# Patient Record
Sex: FEMALE | Employment: FULL TIME | URBAN - METROPOLITAN AREA
[De-identification: names, ages, dates, MRNs, and addresses within clinical notes are randomized per-mention and may not be internally consistent; named-entity substitution may affect disease eponyms.]

---

## 2023-04-19 ENCOUNTER — OFFICE VISIT (OUTPATIENT)
Dept: PRIMARY CARE | Facility: CLINIC | Age: 43
End: 2023-04-19
Payer: COMMERCIAL

## 2023-04-19 VITALS
TEMPERATURE: 98.7 F | BODY MASS INDEX: 28.32 KG/M2 | DIASTOLIC BLOOD PRESSURE: 75 MMHG | HEIGHT: 66 IN | WEIGHT: 176.2 LBS | SYSTOLIC BLOOD PRESSURE: 124 MMHG | HEART RATE: 71 BPM | OXYGEN SATURATION: 99 %

## 2023-04-19 DIAGNOSIS — F41.9 ANXIETY: ICD-10-CM

## 2023-04-19 DIAGNOSIS — R76.8 ANA POSITIVE: ICD-10-CM

## 2023-04-19 DIAGNOSIS — Z00.00 ROUTINE ADULT HEALTH MAINTENANCE: Primary | ICD-10-CM

## 2023-04-19 DIAGNOSIS — R79.89 HIGH SERUM THYROID STIMULATING HORMONE (TSH): ICD-10-CM

## 2023-04-19 DIAGNOSIS — R73.9 ELEVATED BLOOD SUGAR: ICD-10-CM

## 2023-04-19 PROBLEM — E04.1 THYROID NODULE: Status: ACTIVE | Noted: 2023-04-19

## 2023-04-19 LAB — POC HEMOGLOBIN A1C: 5 % (ref 4.2–6.5)

## 2023-04-19 PROCEDURE — 1036F TOBACCO NON-USER: CPT | Performed by: NURSE PRACTITIONER

## 2023-04-19 PROCEDURE — 3008F BODY MASS INDEX DOCD: CPT | Performed by: NURSE PRACTITIONER

## 2023-04-19 PROCEDURE — 99386 PREV VISIT NEW AGE 40-64: CPT | Performed by: NURSE PRACTITIONER

## 2023-04-19 PROCEDURE — 83036 HEMOGLOBIN GLYCOSYLATED A1C: CPT | Performed by: NURSE PRACTITIONER

## 2023-04-19 RX ORDER — UBIDECARENONE 30 MG
30 CAPSULE ORAL DAILY
COMMUNITY

## 2023-04-19 RX ORDER — ASCORBIC ACID 1000 MG
175 TABLET ORAL DAILY
COMMUNITY
End: 2024-04-25 | Stop reason: ALTCHOICE

## 2023-04-19 RX ORDER — SAME BUTANEDISULFONATE/BETAINE 400-600 MG
POWDER IN PACKET (EA) ORAL
COMMUNITY

## 2023-04-19 RX ORDER — MULTIVITAMIN/IRON/FOLIC ACID 18MG-0.4MG
1 TABLET ORAL
COMMUNITY
End: 2024-04-25 | Stop reason: ALTCHOICE

## 2023-04-19 RX ORDER — GLUCOSAM/CHONDRO/HERB 149/HYAL 750-100 MG
TABLET ORAL
COMMUNITY

## 2023-04-19 RX ORDER — DULOXETIN HYDROCHLORIDE 20 MG/1
20 CAPSULE, DELAYED RELEASE ORAL DAILY
Qty: 90 CAPSULE | Refills: 0 | Status: SHIPPED | OUTPATIENT
Start: 2023-04-19 | End: 2024-04-25 | Stop reason: ALTCHOICE

## 2023-04-19 RX ORDER — DULOXETIN HYDROCHLORIDE 20 MG/1
1 CAPSULE, DELAYED RELEASE ORAL DAILY
COMMUNITY
Start: 2023-01-16 | End: 2023-04-19 | Stop reason: SDUPTHER

## 2023-04-19 ASSESSMENT — ENCOUNTER SYMPTOMS
WOUND: 0
EYE PAIN: 0
VOMITING: 0
HEMATURIA: 0
NECK PAIN: 0
ADENOPATHY: 0
POLYPHAGIA: 0
COUGH: 0
DIZZINESS: 0
POLYDIPSIA: 0
CONFUSION: 0
CHILLS: 0
EYE REDNESS: 0
HEADACHES: 0
HALLUCINATIONS: 0
ABDOMINAL PAIN: 0
SHORTNESS OF BREATH: 0
BRUISES/BLEEDS EASILY: 0
SORE THROAT: 0
UNEXPECTED WEIGHT CHANGE: 0
EYE DISCHARGE: 0
TROUBLE SWALLOWING: 0
FATIGUE: 0
PALPITATIONS: 0
BLOOD IN STOOL: 0
APPETITE CHANGE: 0
BACK PAIN: 0
FREQUENCY: 0
DYSURIA: 0
FEVER: 0

## 2023-04-19 ASSESSMENT — PATIENT HEALTH QUESTIONNAIRE - PHQ9
1. LITTLE INTEREST OR PLEASURE IN DOING THINGS: NOT AT ALL
2. FEELING DOWN, DEPRESSED OR HOPELESS: NOT AT ALL
SUM OF ALL RESPONSES TO PHQ9 QUESTIONS 1 AND 2: 0

## 2023-04-19 NOTE — PATIENT INSTRUCTIONS
See rheum     A1C today=  This is the 3 month average of your sugars.  You are in the normal range which is 5.6 or less.      Recheck thyroid testing per dr govea    I will get records from your specialists.    Goal <100 for fasting sugar (100).  Exercise-cardio 4-5d/wk 30min each day  Diet-Breakfast-toast (my favorite Norma Branham Delightful multi-grain and Vipul's Killer Bread thin-sliced Good Seed)/bagel/English muffin-whole wheat flour as a 1st ingredient or cereal/oatmeal/granola bar-fiber 4g or more; protein like eggs or peanut butter is Ok  Lunch-protein, veg 1c  Dinner-protein, veg 1c; if having potatoes, rice, noodles, or pasta-->fist sized; could try brown rice or whole wheat pasta or quinoa  Fruit 2 a day  Dairy 2 a day-milk, almond milk, soy milk, yogurt, cottage cheese, yogurt  Snacks-Protein-hard boiled egg, nuts (walnuts/almonds/pecans/pistachios 1/4c), hummus, beef/deer jerky; vegetable, fruit, dairy-milk(1%, skim, almond, soy)/cheese (not a lot of cheddar)/yogurt (Greek is best-my favorite Dannon Fruit on the Bottom Greek)/cottage cheese 2%; triscuits, popcorn have a lot of fiber; serving size  Water  Limit alcohol to 2 drinks per day (1 drink=12oz beer or 5oz wine or 1 1/2oz liquor)  appt in  6  months; be fasting      I will communicate with you via [employer] regarding messages and results. If you need help with this, you can call the support line at [phone].    IT WAS A PLEASURE TO SEE YOU TODAY. THANK YOU FOR CHOOSING US FOR YOUR HEALTHCARE NEEDS.

## 2023-04-19 NOTE — PROGRESS NOTES
Subjective   Patient ID: Stella Thornton is a 43 y.o. female who presents for Establish Care (Establish care/Fasting- No/Last labs-03/24/Tdap 9/3/2020/Flu shot- 2021/Last colonoscopy-2020/Last pap-10/2022/Last mammo 07/2022/).    new pt-krys-last pcp, gi, ob gyn, others?  Put names of providers in care team-not last pcp  last labs 3/24/23 gluc 100, ldl 110, hdl 44, trigs 143, mg nl, tsh high, t4 nl, t3 nl, vit d nl, cbc nl, loly high speckled 1:640, crp nl; labs per dr govea  is pt fasting? no  Tdap 9/2020  flu shot unavailable  last colonoscopy 2020-due 2025  last pap 10/2022  last mammo 7/8/2022    Family history form      No care team member to display    HPI  Last labs-3/24/23 gluc 100, ldl 110, hdl 44, trigs 143, mg nl, tsh high, t4 high, t3 nl, vit d nl, cbc nl, loly high speckled 1:640, crp nl  Due for labs- tsh, dr govea will recheck    No results found for: CHOL  No results found for: TRIG  No results found for: HDL  No results found for: LDL  No results found for: TSH  No components found for: A1C  No components found for: POCA1C  No results found for: ALBUR  No components found for: POCALBUR      Other concerns: none    bps at home- none    ER/urgicare visits in the last year- none  Hospitalizations in the last year- none      last Pap- 10/2022  H/o abn pap-none    FH ovarian, endometrial, cervical, uterine ca-none    Current birth control method-none  No change in contraception desired      last mammo- (40-75/90) 7/8/2022  Self breast exams-yes    FH br ca-none    last colonoscopy/cologuard/FIT (45-75) 2020  FH colon ca-none      Exercise- wts, exercise bike-fast then slow and walking  Diet-breakfast- eggs  once a wk or quinoa; lemon water; 1 c coffee  Lunch-salad or grab, water  Dinner-protein, starch, veg, water  Tries to stay away from sugar and wheat and dairy  Alcohol once every 2 mon  Snacks-tortilla chips and quac, cheese and gluten free crackers, apple and pb  Body mass index is 28.44 kg/m².          last dental appt- 2 wks    BMs-regular  Sleep-able to fall asleep and stay asleep; no snoring or apnea  no cp, swelling, sob, abd pain, n/v/d/c, blood in stool or black stools  STI testing including hiv (age 15-65) and hep c screening (18-79)-declines        Immunization History   Administered Date(s) Administered    Moderna SARS-CoV-2 Vaccination 01/16/2021, 02/13/2021, 12/10/2021    Tdap 09/03/2020       fractures in lifetime-none      FH heart attack, heart surgery-none  FH stroke-none    The ASCVD Risk score (Mario DK, et al., 2019) failed to calculate for the following reasons:    Cannot find a previous HDL lab    Cannot find a previous total cholesterol lab    Unable to determine if patient is Non-         Review of Systems   Constitutional:  Negative for appetite change, chills, fatigue, fever and unexpected weight change.   HENT:  Negative for congestion, ear pain, sore throat and trouble swallowing.    Eyes:  Negative for pain, discharge and redness.   Respiratory:  Negative for cough and shortness of breath.    Cardiovascular:  Negative for chest pain and palpitations.   Gastrointestinal:  Negative for abdominal pain, blood in stool and vomiting.   Endocrine: Negative for polydipsia, polyphagia and polyuria.   Genitourinary:  Negative for dysuria, frequency, hematuria and urgency.   Musculoskeletal:  Negative for back pain and neck pain.   Skin:  Negative for rash and wound.   Allergic/Immunologic: Negative for immunocompromised state.   Neurological:  Negative for dizziness, syncope and headaches.   Hematological:  Negative for adenopathy. Does not bruise/bleed easily.   Psychiatric/Behavioral:  Negative for confusion and hallucinations.        Objective   Visit Vitals  /75   Pulse 71   Temp 37.1 °C (98.7 °F) (Temporal)      BP Readings from Last 3 Encounters:   04/19/23 124/75     Wt Readings from Last 3 Encounters:   04/19/23 79.9 kg (176 lb 3.2 oz)            Physical Exam  Constitutional:       General: She is not in acute distress.     Appearance: Normal appearance. She is not ill-appearing.   HENT:      Head: Normocephalic.      Right Ear: Tympanic membrane, ear canal and external ear normal.      Left Ear: Tympanic membrane, ear canal and external ear normal.      Nose: Nose normal.      Mouth/Throat:      Mouth: Mucous membranes are moist.      Pharynx: Oropharynx is clear.   Eyes:      Extraocular Movements: Extraocular movements intact.      Conjunctiva/sclera: Conjunctivae normal.      Pupils: Pupils are equal, round, and reactive to light.   Cardiovascular:      Rate and Rhythm: Normal rate and regular rhythm.      Heart sounds: Normal heart sounds. No murmur heard.  Pulmonary:      Effort: Pulmonary effort is normal. No respiratory distress.      Breath sounds: Normal breath sounds. No wheezing, rhonchi or rales.   Abdominal:      General: Bowel sounds are normal.      Palpations: Abdomen is soft. There is no mass.      Tenderness: There is no abdominal tenderness.   Musculoskeletal:         General: No swelling or tenderness. Normal range of motion.      Cervical back: Normal range of motion and neck supple.      Right lower leg: No edema.      Left lower leg: No edema.   Skin:     General: Skin is warm.      Findings: No rash.   Neurological:      General: No focal deficit present.      Mental Status: She is alert and oriented to person, place, and time.      Cranial Nerves: No cranial nerve deficit.      Motor: No weakness.   Psychiatric:         Mood and Affect: Mood normal.         Behavior: Behavior normal.         Assessment/Plan   Diagnoses and all orders for this visit:  Routine adult health maintenance  Anxiety  -     DULoxetine (Cymbalta) 20 mg DR capsule; Take 1 capsule (20 mg) by mouth once daily.  BMI 28.0-28.9,adult  Elevated blood sugar  -     POCT glycosylated hemoglobin (Hb A1C) manually resulted  GUILLERMO positive  -     Referral to  Rheumatology; Future  High serum thyroid stimulating hormone (TSH)

## 2024-02-29 ENCOUNTER — OFFICE VISIT (OUTPATIENT)
Dept: PRIMARY CARE | Facility: CLINIC | Age: 44
End: 2024-02-29
Payer: COMMERCIAL

## 2024-02-29 VITALS
HEIGHT: 66 IN | HEART RATE: 84 BPM | DIASTOLIC BLOOD PRESSURE: 83 MMHG | OXYGEN SATURATION: 95 % | WEIGHT: 195.8 LBS | SYSTOLIC BLOOD PRESSURE: 132 MMHG | BODY MASS INDEX: 31.47 KG/M2 | TEMPERATURE: 97.1 F

## 2024-02-29 DIAGNOSIS — J40 BRONCHITIS: ICD-10-CM

## 2024-02-29 DIAGNOSIS — J01.00 ACUTE NON-RECURRENT MAXILLARY SINUSITIS: Primary | ICD-10-CM

## 2024-02-29 PROCEDURE — 99213 OFFICE O/P EST LOW 20 MIN: CPT | Performed by: NURSE PRACTITIONER

## 2024-02-29 PROCEDURE — 3008F BODY MASS INDEX DOCD: CPT | Performed by: NURSE PRACTITIONER

## 2024-02-29 PROCEDURE — 1036F TOBACCO NON-USER: CPT | Performed by: NURSE PRACTITIONER

## 2024-02-29 RX ORDER — DOXYCYCLINE 100 MG/1
100 CAPSULE ORAL 2 TIMES DAILY
Qty: 20 CAPSULE | Refills: 0 | Status: SHIPPED | OUTPATIENT
Start: 2024-02-29 | End: 2024-03-10

## 2024-02-29 RX ORDER — PREDNISONE 20 MG/1
TABLET ORAL
Qty: 15 TABLET | Refills: 0 | Status: SHIPPED | OUTPATIENT
Start: 2024-02-29 | End: 2024-04-24 | Stop reason: ALTCHOICE

## 2024-02-29 ASSESSMENT — ENCOUNTER SYMPTOMS
CHILLS: 0
DIARRHEA: 0
ARTHRALGIAS: 1
PALPITATIONS: 0
EYE REDNESS: 0
MYALGIAS: 1
ABDOMINAL PAIN: 0
SHORTNESS OF BREATH: 0
FEVER: 0
VOMITING: 0
COUGH: 1
EYE DISCHARGE: 0
CHEST TIGHTNESS: 1
WHEEZING: 1
FATIGUE: 1
HEADACHES: 1
SORE THROAT: 0
SINUS PRESSURE: 0
RHINORRHEA: 0
FACIAL SWELLING: 0

## 2024-02-29 ASSESSMENT — PATIENT HEALTH QUESTIONNAIRE - PHQ9
2. FEELING DOWN, DEPRESSED OR HOPELESS: NOT AT ALL
1. LITTLE INTEREST OR PLEASURE IN DOING THINGS: NOT AT ALL
SUM OF ALL RESPONSES TO PHQ9 QUESTIONS 1 AND 2: 0

## 2024-02-29 NOTE — PATIENT INSTRUCTIONS
Doxycycline  Prednisone  No advil, motrin, ibuprofen, aleve, naproxen or other anti-inflammatories while on prednisone.  Tylenol (acetaminophen) is OK to take.   Dayquil, nyquil  Fluids  Rest  Call if sx worsen or change or not starting to get better in 2-3d    Return in April for wellness      I will communicate with you via [employer] regarding messages and results. If you need help with this, you can call the support line at [phone].    IT WAS A PLEASURE TO SEE YOU TODAY. THANK YOU FOR CHOOSING US FOR YOUR HEALTHCARE NEEDS.

## 2024-02-29 NOTE — PROGRESS NOTES
Subjective   Patient ID: Stella Thornton is a 43 y.o. female who presents for Cough.  Last physical: 4/19/23  last labs -4/2023 A1c 5.0  3/24/23 gluc 100, ldl 110, hdl 44, trigs 143, mg nl, tsh high, t4 nl, t3 nl, vit d nl, cbc nl, loly high speckled 1:640, crp nl; labs per dr govea   Last mammo- 6/7/23    Did pt see rheumatologist? Yes   current sx- cough, body aches, fatigue, post nasal drip, feels like she's wheezing,   when did sx start- 1 week   did pt take a covid-19 test? Yes   if yes when? Sunday     Any other questions or concerns that pt wants to discuss today?   No     HPI  Cough with phlegm, tight upper chest,  body aches, fatigue, post nasal drip, stuffy nose, feels like she's wheezing, HA, glands swollen, ear pressure x 1 week   4d ago covid test neg    no sob,  fever, chills,  ST, new loss of taste or smell, diarrhea, vomiting, nausea, abdominal pain,  weakness, red eye, rash, bruising, cyanosis, ear pain, runny nose, post nasal drip, pain with deep breath, leg or foot swelling, calf pain  loss of appetite- yes  fluids-yes  has urinated at least 3 times in 24hrs  Seasonal allergies-none  Selftxt-tea, nyquil    No known exposure to COVID-19  No known exposure to strep  No known exposure to influenza  No one sick around the pt      Risk factors:  Chronic disease/comorbidities: none  not a healthcare worker  Age: not 65yrs of age and older      No care team member to display     Review of Systems   Constitutional:  Positive for fatigue. Negative for chills and fever.   HENT:  Positive for congestion and postnasal drip. Negative for ear discharge, ear pain, facial swelling, rhinorrhea, sinus pressure and sore throat.    Eyes:  Negative for discharge and redness.   Respiratory:  Positive for cough, chest tightness and wheezing. Negative for shortness of breath.    Cardiovascular:  Negative for chest pain, palpitations and leg swelling.   Gastrointestinal:  Negative for abdominal pain, diarrhea and vomiting.    Musculoskeletal:  Positive for arthralgias and myalgias.   Skin:  Negative for rash.   Neurological:  Positive for headaches.       Objective   Visit Vitals  /83   Pulse 84   Temp 36.2 °C (97.1 °F)      BP Readings from Last 3 Encounters:   02/29/24 132/83   04/19/23 124/75     Wt Readings from Last 3 Encounters:   02/29/24 88.8 kg (195 lb 12.8 oz)   04/19/23 79.9 kg (176 lb 3.2 oz)       Physical Exam  Constitutional:       Appearance: Normal appearance.   HENT:      Head: Normocephalic.      Right Ear: Tympanic membrane, ear canal and external ear normal.      Left Ear: Tympanic membrane, ear canal and external ear normal.      Nose: Nose normal.      Mouth/Throat:      Mouth: Mucous membranes are moist.      Pharynx: No oropharyngeal exudate or posterior oropharyngeal erythema.   Cardiovascular:      Rate and Rhythm: Normal rate and regular rhythm.      Heart sounds: Normal heart sounds.   Pulmonary:      Effort: Pulmonary effort is normal.      Breath sounds: Rhonchi present. No wheezing or rales.   Lymphadenopathy:      Cervical: No cervical adenopathy.   Neurological:      Mental Status: She is alert.       Assessment/Plan   Diagnoses and all orders for this visit:  Acute non-recurrent maxillary sinusitis  -     doxycycline (Vibramycin) 100 mg capsule; Take 1 capsule (100 mg) by mouth 2 times a day for 10 days. Take with at least 8 ounces (large glass) of water, do not lie down for 30 minutes after  Bronchitis  -     doxycycline (Vibramycin) 100 mg capsule; Take 1 capsule (100 mg) by mouth 2 times a day for 10 days. Take with at least 8 ounces (large glass) of water, do not lie down for 30 minutes after  -     predniSONE (Deltasone) 20 mg tablet; Take 2 tabs daily x 5d then 1 tab daily x 3d then 1/2 tab daily x 3d  Other orders  -     Follow Up In Primary Care - Health Maintenance; Future

## 2024-04-24 ASSESSMENT — ENCOUNTER SYMPTOMS
DYSURIA: 0
HEMATURIA: 0
TROUBLE SWALLOWING: 0
COUGH: 0
SORE THROAT: 0
FREQUENCY: 0
SHORTNESS OF BREATH: 0
DIZZINESS: 0
ABDOMINAL PAIN: 0
EYE PAIN: 0
VOMITING: 0
BACK PAIN: 0
EYE DISCHARGE: 0
POLYDIPSIA: 0
CHILLS: 0
PALPITATIONS: 0
BRUISES/BLEEDS EASILY: 0
FATIGUE: 0
CONFUSION: 0
APPETITE CHANGE: 0
HEADACHES: 0
POLYPHAGIA: 0
UNEXPECTED WEIGHT CHANGE: 0
EYE REDNESS: 0
WOUND: 0
HALLUCINATIONS: 0
FEVER: 0
ADENOPATHY: 0
BLOOD IN STOOL: 0
NECK PAIN: 0

## 2024-04-24 NOTE — PROGRESS NOTES
"Subjective   Patient ID: Stella Thornton is a 44 y.o. female who presents for Annual Exam.    Does pt see any other provides than:  homer Osei issa, gingo    is pt fasting? No   Who did last pap? Had hyster    Who did last colonoscopy? Dr. Uriostegui  6/19/20; due 6/2025    Does pt have any questions today? Feels like random heart racing, weight gain     Poc A1c=5.0      Seeing dr govea for thyroid and is a functional med dr Manley care team member to display    HPI  Last labs-4/2023 A1c 5.0  3/24/23 gluc 100, ldl 110, hdl 44, trigs 143, mg nl, tsh high, t4 high, t3 nl, vit d nl, cbc nl, loly high speckled 1:640, crp nl  Due for labs- all, not sure if going to dr govea so pt wants me to check thyroid labs    No results found for: \"CHOL\"  No results found for: \"TRIG\"  No results found for: \"HDL\"  No results found for: \"LDL\"  No results found for: \"TSH\"  No results found for: \"A1C\"  No components found for: \"POCA1C\"  No results found for: \"ALBUR\"  No components found for: \"POCALBUR\"      Other concerns: Feels like random heart racing when sitting or reading x 2mon  No cp, sob, wheezing, dizziness, HA, vision change    Hip pain when laying on side-for yrs    weight gain     bps at home- none    ER/urgicare visits in the last year- none  Hospitalizations in the last year- none      last Pap- hyster  H/o abn pap-none    FH ovarian, endometrial, cervical, uterine ca-none    Current birth control method-none, hyster  No change in contraception desired      last mammo- (40-75/90) 6/7/23; has appt scheduled  Self breast exams-yes    FH br ca-none    last colonoscopy/cologuard/FIT (45-75) 3/25/25; due 3/2030 dr uriostegui  FH colon ca-none      Exercise- wts, exercise bike-fast then slow and walking  Diet-breakfast- eggs  once a wk or quinoa; lemon water; 1 c coffee no sugar  Lunch-salad or grab, water  Dinner-protein, starch, veg, water  Tries to stay away from sugar and wheat and dairy  Alcohol once every 2 mon  Snacks-tortilla chips and " quac, cheese and gluten free crackers, apple and pb  Body mass index is 30.96 kg/m².         last dental appt- yesterday; pt is a dental hygienist    BMs-regular  Sleep-able to fall asleep and stay asleep-urinate once-hips hurt laying on sides; no snoring or apnea; no daytime tiredness  no cp, swelling, sob, abd pain, n/v/d/c, blood in stool or black stools  STI testing including hiv (age 15-65) and hep c screening (18-79)-declines        Immunization History   Administered Date(s) Administered    Moderna SARS-CoV-2 Vaccination 01/16/2021, 02/13/2021, 12/10/2021    Tdap vaccine, age 7 year and older (BOOSTRIX, ADACEL) 09/03/2020       fractures in lifetime-none  Fh osteoporosis-none    FH heart attack, heart surgery-none  FH stroke-none    The ASCVD Risk score (Mario DK, et al., 2019) failed to calculate for the following reasons:    Cannot find a previous HDL lab    Cannot find a previous total cholesterol lab    Unable to determine if patient is Non-         Review of Systems   Constitutional:  Negative for appetite change, chills, fatigue, fever and unexpected weight change.   HENT:  Negative for congestion, ear pain, sore throat and trouble swallowing.    Eyes:  Negative for pain, discharge and redness.   Respiratory:  Negative for cough and shortness of breath.    Cardiovascular:  Negative for chest pain and palpitations.        Tachycardia   Gastrointestinal:  Negative for abdominal pain, blood in stool and vomiting.   Endocrine: Negative for polydipsia, polyphagia and polyuria.   Genitourinary:  Negative for dysuria, frequency, hematuria and urgency.   Musculoskeletal:  Negative for back pain and neck pain.   Skin:  Negative for rash and wound.   Allergic/Immunologic: Negative for immunocompromised state.   Neurological:  Negative for dizziness, syncope and headaches.   Hematological:  Negative for adenopathy. Does not bruise/bleed easily.   Psychiatric/Behavioral:  Negative for  confusion and hallucinations.        Objective   Visit Vitals  /83   Pulse 69   Temp 36.2 °C (97.1 °F)        BP Readings from Last 3 Encounters:   04/25/24 137/83   02/29/24 132/83   04/19/23 124/75     Wt Readings from Last 3 Encounters:   04/25/24 87 kg (191 lb 12.8 oz)   02/29/24 88.8 kg (195 lb 12.8 oz)   04/19/23 79.9 kg (176 lb 3.2 oz)           Physical Exam  Constitutional:       General: She is not in acute distress.     Appearance: Normal appearance. She is not ill-appearing.   HENT:      Head: Normocephalic.      Right Ear: Tympanic membrane, ear canal and external ear normal.      Left Ear: Tympanic membrane, ear canal and external ear normal.      Nose: Nose normal.      Mouth/Throat:      Mouth: Mucous membranes are moist.      Pharynx: Oropharynx is clear.   Eyes:      Extraocular Movements: Extraocular movements intact.      Conjunctiva/sclera: Conjunctivae normal.      Pupils: Pupils are equal, round, and reactive to light.   Cardiovascular:      Rate and Rhythm: Normal rate and regular rhythm.      Heart sounds: Normal heart sounds. No murmur heard.  Pulmonary:      Effort: Pulmonary effort is normal. No respiratory distress.      Breath sounds: Normal breath sounds. No wheezing, rhonchi or rales.   Abdominal:      General: Bowel sounds are normal.      Palpations: Abdomen is soft. There is no mass.      Tenderness: There is no abdominal tenderness.   Musculoskeletal:         General: No swelling or tenderness. Normal range of motion.      Cervical back: Normal range of motion and neck supple.      Right lower leg: No edema.      Left lower leg: No edema.   Skin:     General: Skin is warm.      Findings: No rash.   Neurological:      General: No focal deficit present.      Mental Status: She is alert and oriented to person, place, and time.      Cranial Nerves: No cranial nerve deficit.      Motor: No weakness.   Psychiatric:         Mood and Affect: Mood normal.         Behavior: Behavior  normal.         Assessment/Plan   Diagnoses and all orders for this visit:  Routine general medical examination at a health care facility  -     Comprehensive Metabolic Panel; Future  -     CBC and Auto Differential; Future  -     Lipid Panel; Future  -     TSH; Future  Tachycardia  -     ECG 12 Lead  -     Referral to Cardiology; Future  Elevated blood sugar  -     POCT glycosylated hemoglobin (Hb A1C) manually resulted  Thyroid nodule  -     T4, free; Future  -     T3, free; Future  Fatigue, unspecified type  -     Vitamin D 25-Hydroxy,Total (for eval of Vitamin D levels); Future  GUILLERMO positive  -     GUILLERMO; Future  Other orders  -     Follow Up In Primary Care - Health Maintenance  -     Follow Up In Primary Care - Health Maintenance; Future

## 2024-04-25 ENCOUNTER — OFFICE VISIT (OUTPATIENT)
Dept: PRIMARY CARE | Facility: CLINIC | Age: 44
End: 2024-04-25
Payer: COMMERCIAL

## 2024-04-25 VITALS
DIASTOLIC BLOOD PRESSURE: 83 MMHG | TEMPERATURE: 97.1 F | WEIGHT: 191.8 LBS | HEART RATE: 69 BPM | BODY MASS INDEX: 30.82 KG/M2 | OXYGEN SATURATION: 98 % | SYSTOLIC BLOOD PRESSURE: 137 MMHG | HEIGHT: 66 IN

## 2024-04-25 DIAGNOSIS — Z00.00 ROUTINE GENERAL MEDICAL EXAMINATION AT A HEALTH CARE FACILITY: Primary | ICD-10-CM

## 2024-04-25 DIAGNOSIS — R76.8 ANA POSITIVE: ICD-10-CM

## 2024-04-25 DIAGNOSIS — R00.0 TACHYCARDIA: ICD-10-CM

## 2024-04-25 DIAGNOSIS — R73.9 ELEVATED BLOOD SUGAR: ICD-10-CM

## 2024-04-25 DIAGNOSIS — E04.1 THYROID NODULE: ICD-10-CM

## 2024-04-25 DIAGNOSIS — R53.83 FATIGUE, UNSPECIFIED TYPE: ICD-10-CM

## 2024-04-25 LAB — POC HEMOGLOBIN A1C: 5 % (ref 4.2–6.5)

## 2024-04-25 PROCEDURE — 1036F TOBACCO NON-USER: CPT | Performed by: NURSE PRACTITIONER

## 2024-04-25 PROCEDURE — 93000 ELECTROCARDIOGRAM COMPLETE: CPT | Performed by: NURSE PRACTITIONER

## 2024-04-25 PROCEDURE — 83036 HEMOGLOBIN GLYCOSYLATED A1C: CPT | Performed by: NURSE PRACTITIONER

## 2024-04-25 PROCEDURE — 99396 PREV VISIT EST AGE 40-64: CPT | Performed by: NURSE PRACTITIONER

## 2024-04-25 ASSESSMENT — PATIENT HEALTH QUESTIONNAIRE - PHQ9
2. FEELING DOWN, DEPRESSED OR HOPELESS: NOT AT ALL
SUM OF ALL RESPONSES TO PHQ9 QUESTIONS 1 AND 2: 0
1. LITTLE INTEREST OR PLEASURE IN DOING THINGS: NOT AT ALL

## 2024-04-25 NOTE — PATIENT INSTRUCTIONS
See cardio     EKG today    A1C today=5.0  This is the 3 month average of your sugars.  You are in the normal range which is 5.6 or less.    Keep mammo appt    We will get colon result from dr uriostegui    Handouts given to pt:  physical handout      I recommend signing up for MyChart.    Labs:    You can use the lab in our building when fasting. The hrs are: Monday-Friday, 7 a.m. - 5 p.m., Saturday 8 a.m. - 12 noon.   No appt needed, BUT YOU DO NEED THE PAPER ORDER.    Fasting is no food, drink, gum or mints other than water for 12 hrs.   Results will be back in 2-3 business days for most labs. It is always recommended for any orders (labs, xrays, ultrasounds,MRI, ct scan, procedures etc) to check with your insurance provider for expected costs or expenses to you.       You will get your results via phone from my medical assistant if you do not have MyChart.  OR  You will get your results via [employer]    If a result is urgent, I will call to speak to you.    Vaccines:  Up to date    General recommendations:  Exercise-cardio 4-5d/wk 30min each day  Diet-Breakfast-toast (my favorite Norma Branham Delighful Multigrain or Vipul's Killer Bread Good Seed thin-sliced)/bagel/English muffin-whole wheat flour as a 1st ingredient or cereal/oatmeal/granola bar-fiber 4g or more or protein like eggs or peanut butter; optional veggies  Lunch-protein, 1/2c carb or 2 slices bread, veg 1c  Dinner-protein, fist sized carb, veg 1c  Fruit 2 a day  Dairy 2 a day-milk, soy milk, almond milk, cheese, yogurt, cottage cheese  Snacks-Protein-hard boiled egg, nuts (walnuts/almonds/pecans/pistachios 1/4c), hummus, beef/deer jerky or meat sticks; vegetable, fruit, dairy-milk(1%, skim, almond, soy)/cheese (not a lot of cheddar)/yogurt (Greek is best-my favorite Dannon Fruit on the Bottom Greek)/cottage cheese 2%; triscuits/ popcorn/wheat thins have a lot of fiber; follow serving size on bag/box/container  increase water  Limit alcohol to 1 drink per day  for women and 2 drinks per day for men (1 drink=12oz beer or 5oz wine or 1 1/2oz liquor)  Calcium: 500mg 1 twice a day if age 50 and younger and 600mg 1 twice a day if over age 50 (calcium citrate can be taken without food)  Vitamin D: 800-5000 IU/day  Limit salt to <2300mg a day if age 50 and under and <1500mg a day if over age 50/have high bp or diabetes or kidney disease  Recommend folate for childbearing age women 0.4mg per day (can be found in a multivitamin)  Recommend 18mg/dL of iron a day if age 50 and under and 8mg/dL a day if over age 50; take on an empty stomach at bedtime  Use sunscreen   Wear seatbelt  Recommend safe sex practices: using condoms everytime you have sex, discuss with a new partner about their past partners/history of STDs/drug use, avoid drinking alcohol or using drugs as this increases the chance that you will participate in high-risk sex, for oral sex help protect your mouth by having your partner use a condom (male or female), women should not douche after sex, be aware of your partner's body and your body-look for signs of a sore, blister, rash, or discharge, and have regular exams and periodic tests for STDs.  No distracted driving  No driving when under influence of substances  Wear a seatbelt  Eye dr every 1-2yrs  Dentist every 6-12 mon  Tetanus shot every 10yrs  Recommend flu vaccine in the fall  Appt in  1 year for physical      I will communicate with you via [employer] regarding messages and results. If you need help with this, you can call the support line at [phone].    IT WAS A PLEASURE TO SEE YOU TODAY. THANK YOU FOR CHOOSING US FOR YOUR HEALTHCARE NEEDS.

## 2024-05-07 LAB
NON-UH HIE A/G RATIO: 1.3
NON-UH HIE ALB: 4.1 G/DL (ref 3.4–5)
NON-UH HIE ALK PHOS: 75 UNIT/L (ref 45–117)
NON-UH HIE BASO COUNT: 0.04 X1000 (ref 0–0.2)
NON-UH HIE BASOS %: 0.7 %
NON-UH HIE BILIRUBIN, TOTAL: 1.8 MG/DL (ref 0.3–1.2)
NON-UH HIE BUN/CREAT RATIO: 16.7
NON-UH HIE BUN: 15 MG/DL (ref 9–23)
NON-UH HIE CALCIUM: 9.3 MG/DL (ref 8.7–10.4)
NON-UH HIE CALCULATED LDL CHOLESTEROL: 121 MG/DL (ref 60–130)
NON-UH HIE CALCULATED OSMOLALITY: 282 MOSM/KG (ref 275–295)
NON-UH HIE CHLORIDE: 108 MMOL/L (ref 98–107)
NON-UH HIE CHOLESTEROL: 180 MG/DL (ref 100–200)
NON-UH HIE CO2, VENOUS: 26 MMOL/L (ref 20–31)
NON-UH HIE CREATININE: 0.9 MG/DL (ref 0.5–0.8)
NON-UH HIE DIFF?: NO
NON-UH HIE EOS COUNT: 0.1 X1000 (ref 0–0.5)
NON-UH HIE EOSIN %: 1.8 %
NON-UH HIE FREE T3: 3.1 PG/ML (ref 2.3–4.2)
NON-UH HIE FREE T4: 1.14 NG/DL (ref 0.89–1.76)
NON-UH HIE GFR AA: >60
NON-UH HIE GLOBULIN: 3.1 G/DL
NON-UH HIE GLOMERULAR FILTRATION RATE: >60 ML/MIN/1.73M?
NON-UH HIE GLUCOSE: 92 MG/DL (ref 74–106)
NON-UH HIE GOT: 52 UNIT/L (ref 15–37)
NON-UH HIE GPT: 31 UNIT/L (ref 10–49)
NON-UH HIE HCT: 42.1 % (ref 36–46)
NON-UH HIE HDL CHOLESTEROL: 39 MG/DL (ref 40–60)
NON-UH HIE HGB: 14 G/DL (ref 12–16)
NON-UH HIE INSTR WBC: 5.4
NON-UH HIE K: 4.6 MMOL/L (ref 3.5–5.1)
NON-UH HIE LYMPH %: 26.4 %
NON-UH HIE LYMPH COUNT: 1.41 X1000 (ref 1.2–4.8)
NON-UH HIE MCH: 30.2 PG (ref 27–34)
NON-UH HIE MCHC: 33.2 G/DL (ref 32–37)
NON-UH HIE MCV: 91 FL (ref 80–100)
NON-UH HIE MONO %: 8.1 %
NON-UH HIE MONO COUNT: 0.43 X1000 (ref 0.1–1)
NON-UH HIE MPV: 9.5 FL (ref 7.4–10.4)
NON-UH HIE NA: 141 MMOL/L (ref 135–145)
NON-UH HIE NEUTROPHIL %: 63.1 %
NON-UH HIE NEUTROPHIL COUNT (ANC): 3.38 X1000 (ref 1.4–8.8)
NON-UH HIE NUCLEATED RBC: 0 /100WBC
NON-UH HIE PLATELET: 217 X10 (ref 150–450)
NON-UH HIE RBC: 4.62 X10 (ref 4.2–5.4)
NON-UH HIE RDW: 12.3 % (ref 11.5–14.5)
NON-UH HIE TOTAL CHOL/HDL CHOL RATIO: 4.6
NON-UH HIE TOTAL PROTEIN: 7.2 G/DL (ref 5.7–8.2)
NON-UH HIE TRIGLYCERIDES: 99 MG/DL (ref 30–150)
NON-UH HIE TSH: 2.9 UIU/ML (ref 0.55–4.78)
NON-UH HIE VIT D 25: 81 NG/ML
NON-UH HIE WBC: 5.4 X10 (ref 4.5–11)

## 2024-05-08 ENCOUNTER — PATIENT MESSAGE (OUTPATIENT)
Dept: PRIMARY CARE | Facility: CLINIC | Age: 44
End: 2024-05-08
Payer: COMMERCIAL

## 2024-05-08 DIAGNOSIS — R10.2 PELVIC PAIN: Primary | ICD-10-CM

## 2024-05-08 LAB
NON-UH HIE ANA PATTERN: NORMAL
NON-UH HIE ANTI-NUCLEAR AB QUANT: NORMAL
NON-UH HIE ANTI-NUCLEAR ANTIBODY: POSITIVE

## 2024-05-09 ENCOUNTER — OFFICE VISIT (OUTPATIENT)
Dept: PRIMARY CARE | Facility: CLINIC | Age: 44
End: 2024-05-09
Payer: COMMERCIAL

## 2024-05-09 VITALS
WEIGHT: 190.2 LBS | SYSTOLIC BLOOD PRESSURE: 128 MMHG | HEART RATE: 69 BPM | HEIGHT: 66 IN | DIASTOLIC BLOOD PRESSURE: 70 MMHG | TEMPERATURE: 96.9 F | OXYGEN SATURATION: 98 % | BODY MASS INDEX: 30.57 KG/M2

## 2024-05-09 DIAGNOSIS — R74.8 ELEVATED LIVER ENZYMES: ICD-10-CM

## 2024-05-09 DIAGNOSIS — R35.0 URINARY FREQUENCY: Primary | ICD-10-CM

## 2024-05-09 LAB
POC APPEARANCE, URINE: CLEAR
POC BILIRUBIN, URINE: NEGATIVE
POC BLOOD, URINE: NEGATIVE
POC COLOR, URINE: YELLOW
POC GLUCOSE, URINE: NEGATIVE MG/DL
POC KETONES, URINE: ABNORMAL MG/DL
POC LEUKOCYTES, URINE: NEGATIVE
POC NITRITE,URINE: POSITIVE
POC PH, URINE: 5.5 PH
POC PROTEIN, URINE: NEGATIVE MG/DL
POC SPECIFIC GRAVITY, URINE: 1.02
POC UROBILINOGEN, URINE: 0.2 EU/DL

## 2024-05-09 PROCEDURE — 1036F TOBACCO NON-USER: CPT | Performed by: NURSE PRACTITIONER

## 2024-05-09 PROCEDURE — 81003 URINALYSIS AUTO W/O SCOPE: CPT | Performed by: NURSE PRACTITIONER

## 2024-05-09 PROCEDURE — 87086 URINE CULTURE/COLONY COUNT: CPT

## 2024-05-09 PROCEDURE — 99213 OFFICE O/P EST LOW 20 MIN: CPT | Performed by: NURSE PRACTITIONER

## 2024-05-09 RX ORDER — SULFAMETHOXAZOLE AND TRIMETHOPRIM 800; 160 MG/1; MG/1
1 TABLET ORAL 2 TIMES DAILY
Qty: 14 TABLET | Refills: 0 | Status: SHIPPED | OUTPATIENT
Start: 2024-05-09 | End: 2024-05-16

## 2024-05-09 ASSESSMENT — PATIENT HEALTH QUESTIONNAIRE - PHQ9
1. LITTLE INTEREST OR PLEASURE IN DOING THINGS: NOT AT ALL
SUM OF ALL RESPONSES TO PHQ9 QUESTIONS 1 AND 2: 0
2. FEELING DOWN, DEPRESSED OR HOPELESS: NOT AT ALL

## 2024-05-09 ASSESSMENT — ENCOUNTER SYMPTOMS
CONSTITUTIONAL NEGATIVE: 1
SHORTNESS OF BREATH: 0
WHEEZING: 0

## 2024-05-09 NOTE — PATIENT INSTRUCTIONS
Liver lab in 2wks  No fasting or appt needed    Follow up with rheumatologist re: positive GUILLERMO    urine culture result back in 2-3d  push fluids; can try cranberry juice  avoid drinks that irritate the bladder like coffee, alcohol, and soft drinks containing citrus juices or caffeine until your infection has cleared.   rest  tylenol  ibuprofen  heating pad on abdomen  start antibiotic; you can stop the antibiotic if the culture comes back negative/normal.  call if sx worsen or change especially fever, chills, or back pain or not starting to get better in 2-3d        I will communicate with you via [employer] regarding messages and results. If you need help with this, you can call the support line at [phone].    IT WAS A PLEASURE TO SEE YOU TODAY. THANK YOU FOR CHOOSING US FOR YOUR HEALTHCARE NEEDS.

## 2024-05-09 NOTE — PROGRESS NOTES
Subjective   Patient ID: Stella Thornton is a 44 y.o. female who presents for UTI.  Last physical 4/25/24   Last labs 5/2024 One liver enzyme is high. Decrease alcohol and tylenol (acetaminophen) if applicable.  Recheck lab in 2wks. You do not need to be fasting.  Sugar (aka glucose), kidney function,  and electrolytes in the CMP (comprehensive metabolic panel) were normal  Cholesterol:  Trigs normal  Hdl low at 39. Goal >50 for women. Incr exercise  Ldl high at 121. Goal <100. Decr fats and incr fiber.   TSH , T4, T3 (thyroid tests) were normal.  Vit D is normal.  CBC (complete blood count) was normal which looks at infection and anemia markers.  A1c 5.0    When did sx start? Monday   What sx is she having? Frequency, pressure, urgency     Took uti otc med to help       HPI  Print lab order    No heart issues since last appt so holding off on cardio dr appt    uti symptoms for 3d  Frequency, pressure, urgency   no dysuria, hematuria, lower back pain, fever, chills, cloudy urine, odor to urine, small amount of urine when urinate, nausea, vomiting, incontinence  no itching, skin feels like it is burning, redness, rash, thick (thin or watery) vaginal d/c, irritation of genital area, swelling of genital area, painful IC  no fishy vaginal odor especially after IC or thin whitish-gray vaginal d/c    selftxt: azo 1 dose        No care team member to display     Review of Systems   Constitutional: Negative.    Respiratory:  Negative for shortness of breath and wheezing.    Cardiovascular:  Negative for chest pain.       Objective   Visit Vitals  /70   Pulse 69   Temp 36.1 °C (96.9 °F)      BP Readings from Last 3 Encounters:   05/09/24 128/70   04/25/24 137/83   02/29/24 132/83     Wt Readings from Last 3 Encounters:   05/09/24 86.3 kg (190 lb 3.2 oz)   04/25/24 87 kg (191 lb 12.8 oz)   02/29/24 88.8 kg (195 lb 12.8 oz)       Physical Exam  Constitutional:       General: She is not in acute distress.     Appearance:  Normal appearance.   Neurological:      Mental Status: She is alert.         Assessment/Plan   Diagnoses and all orders for this visit:  Urinary frequency  -     Urine Culture; Future  -     POCT UA Automated manually resulted; Future  -     sulfamethoxazole-trimethoprim (Bactrim DS) 800-160 mg tablet; Take 1 tablet by mouth 2 times a day for 7 days.  Elevated liver enzymes  -     Hepatic Function Panel; Future  Other orders  -     Follow Up In Primary Care - Health Maintenance; Future

## 2024-05-10 DIAGNOSIS — R35.0 URINARY FREQUENCY: Primary | ICD-10-CM

## 2024-05-10 DIAGNOSIS — R74.8 ELEVATED LIVER ENZYMES: Primary | ICD-10-CM

## 2024-05-10 LAB — BACTERIA UR CULT: NORMAL

## 2024-05-15 ENCOUNTER — TELEPHONE (OUTPATIENT)
Dept: PRIMARY CARE | Facility: CLINIC | Age: 44
End: 2024-05-15
Payer: COMMERCIAL

## 2024-05-15 NOTE — TELEPHONE ENCOUNTER
Pt has not viewed the test results on [employer].  She viewed the vit d results but not the others. Just want to make sure she saw the entire result note.   Pls call pt.       Trigs normal  Ldl high at 121. Goal <100. Decr fats and incr fiber.  Hdl low at 39. Goal >50 for women. Incr exercise  One liver enzyme is high. Decrease alcohol and tylenol (acetaminophen) if applicable.  Recheck lab in 2wks. You do not need to be fasting.  Sugar (aka glucose), kidney function,  and electrolytes in the CMP (comprehensive metabolic panel) were normal.  Vitamin D is normal. Continue otc vitamin d.  All thyroid labs normal.  CBC (complete blood count) was normal which looks at infection and anemia markers.

## 2024-05-16 NOTE — TELEPHONE ENCOUNTER
Informed patient.  She did see all of the results and will recheck the labs next week.  She also has appt with [facility] urology next week.

## 2024-05-23 LAB
NON-UH HIE ALB: 4.3 G/DL (ref 3.4–5)
NON-UH HIE ALK PHOS: 70 UNIT/L (ref 45–117)
NON-UH HIE BILIRUBIN, DIRECT: 0.4 MG/DL (ref 0–0.4)
NON-UH HIE BILIRUBIN, TOTAL: 1.2 MG/DL (ref 0.3–1.2)
NON-UH HIE GOT: 19 UNIT/L (ref 15–37)
NON-UH HIE GPT: 19 UNIT/L (ref 10–49)
NON-UH HIE TOTAL PROTEIN: 7.4 G/DL (ref 5.7–8.2)

## 2024-05-28 ENCOUNTER — PATIENT MESSAGE (OUTPATIENT)
Dept: PRIMARY CARE | Facility: CLINIC | Age: 44
End: 2024-05-28
Payer: COMMERCIAL

## 2024-05-28 DIAGNOSIS — N32.81 OAB (OVERACTIVE BLADDER): Primary | ICD-10-CM

## 2024-05-28 RX ORDER — MIRABEGRON 25 MG/1
25 TABLET, FILM COATED, EXTENDED RELEASE ORAL DAILY
Start: 2024-05-28

## 2024-10-01 ENCOUNTER — OFFICE VISIT (OUTPATIENT)
Dept: PRIMARY CARE | Facility: CLINIC | Age: 44
End: 2024-10-01
Payer: COMMERCIAL

## 2024-10-01 VITALS
TEMPERATURE: 97.5 F | HEIGHT: 66 IN | HEART RATE: 78 BPM | WEIGHT: 199 LBS | SYSTOLIC BLOOD PRESSURE: 133 MMHG | BODY MASS INDEX: 31.98 KG/M2 | OXYGEN SATURATION: 99 % | DIASTOLIC BLOOD PRESSURE: 76 MMHG

## 2024-10-01 DIAGNOSIS — M79.674 PAIN OF RIGHT GREAT TOE: Primary | ICD-10-CM

## 2024-10-01 PROBLEM — R74.8 ELEVATED LIVER ENZYMES: Status: RESOLVED | Noted: 2024-05-09 | Resolved: 2024-10-01

## 2024-10-01 PROBLEM — R79.89 HIGH SERUM THYROID STIMULATING HORMONE (TSH): Status: RESOLVED | Noted: 2023-04-19 | Resolved: 2024-10-01

## 2024-10-01 PROBLEM — Z00.00 ROUTINE ADULT HEALTH MAINTENANCE: Status: RESOLVED | Noted: 2023-04-19 | Resolved: 2024-10-01

## 2024-10-01 PROCEDURE — 1036F TOBACCO NON-USER: CPT | Performed by: NURSE PRACTITIONER

## 2024-10-01 PROCEDURE — 3008F BODY MASS INDEX DOCD: CPT | Performed by: NURSE PRACTITIONER

## 2024-10-01 PROCEDURE — 99213 OFFICE O/P EST LOW 20 MIN: CPT | Performed by: NURSE PRACTITIONER

## 2024-10-01 RX ORDER — TRAMADOL HYDROCHLORIDE 50 MG/1
25-50 TABLET ORAL EVERY 6 HOURS PRN
Qty: 24 TABLET | Refills: 0 | Status: SHIPPED | OUTPATIENT
Start: 2024-10-01 | End: 2024-10-07

## 2024-10-01 RX ORDER — PREDNISONE 20 MG/1
TABLET ORAL
Qty: 20 TABLET | Refills: 0 | Status: SHIPPED | OUTPATIENT
Start: 2024-10-01

## 2024-10-01 RX ORDER — DOXYCYCLINE 100 MG/1
100 CAPSULE ORAL 2 TIMES DAILY
Qty: 20 CAPSULE | Refills: 0 | Status: SHIPPED | OUTPATIENT
Start: 2024-10-01 | End: 2024-10-11

## 2024-10-01 ASSESSMENT — PATIENT HEALTH QUESTIONNAIRE - PHQ9
SUM OF ALL RESPONSES TO PHQ9 QUESTIONS 1 AND 2: 0
2. FEELING DOWN, DEPRESSED OR HOPELESS: NOT AT ALL
1. LITTLE INTEREST OR PLEASURE IN DOING THINGS: NOT AT ALL

## 2024-10-01 ASSESSMENT — ENCOUNTER SYMPTOMS
WHEEZING: 0
CONSTITUTIONAL NEGATIVE: 1
SHORTNESS OF BREATH: 0

## 2024-10-01 NOTE — PROGRESS NOTES
Subjective   Patient ID: Stella Thornton is a 44 y.o. female who presents for Toe Injury.  Last physical:  4/25/24    Does pt want flu shot? No   What sx is pt having? Throbbing in right big toe   When did the sx start? Sunday   Any other questions or concerns that pt wants to discuss today? No       HPI  15yrs ago rt gr toe hurt-never grew back normal.  Stubbed rt gr toe 2d ago and nail ripped off-cut off part of toenail  Last night rt gr toe started throbbing  Redness and swelling and pain noted  No fever or chills  No crusting or discharge noted  Selftxt: none    I have personally reviewed the [facility] report for this patient. I have considered the risks of abuse, dependence, addiction and diversion. No concerns.      No care team member to display     Review of Systems   Constitutional: Negative.    Respiratory:  Negative for shortness of breath and wheezing.    Cardiovascular:  Negative for chest pain.   Musculoskeletal:         Rt gr toe pain       Objective   Visit Vitals  /76   Pulse 78   Temp 36.4 °C (97.5 °F)      BP Readings from Last 3 Encounters:   10/01/24 133/76   05/09/24 128/70   04/25/24 137/83     Wt Readings from Last 3 Encounters:   10/01/24 90.3 kg (199 lb)   05/09/24 86.3 kg (190 lb 3.2 oz)   04/25/24 87 kg (191 lb 12.8 oz)       Physical Exam  Constitutional:       General: She is not in acute distress.     Appearance: Normal appearance.   Musculoskeletal:      Comments: Rt gr toe redness and swelling and pain  No discharge or crusting  Pedal pulses 2+  Nail is cut down   Nail thick and discolored as usual   Neurological:      Mental Status: She is alert.       Assessment/Plan   Diagnoses and all orders for this visit:  Pain of right great toe  -     doxycycline (Vibramycin) 100 mg capsule; Take 1 capsule (100 mg) by mouth 2 times a day for 10 days. Take with at least 8 ounces (large glass) of water, do not lie down for 30 minutes after  -     predniSONE (Deltasone) 20 mg tablet; 3 tabs  daily x3d then 2 tabs daily x 3d then 1 tab daily x 3d then 1/2 tab daily x 3d with food  -     traMADol (Ultram) 50 mg tablet; Take 0.5-1 tablets (25-50 mg) by mouth every 6 hours if needed for severe pain (7 - 10) for up to 6 days.  Other orders  -     Follow Up In Primary Care - Health Maintenance; Future

## 2024-10-01 NOTE — PATIENT INSTRUCTIONS
Doxycycline  Prednisone  No advil, motrin, ibuprofen, aleve, naproxen or other anti-inflammatories while on prednisone.  Tylenol (acetaminophen) is OK to take.   Antibiotic ointment around nail  Pain med if needed    Return in April for wellness    I will communicate with you via eeden regarding messages and results. If you need help with this, you can call the support line at [phone].    IT WAS A PLEASURE TO SEE YOU TODAY. THANK YOU FOR CHOOSING US FOR YOUR HEALTHCARE NEEDS.

## 2024-12-24 LAB
NON-UH HIE A/G RATIO: 1.3
NON-UH HIE ALB: 4 G/DL (ref 3.4–5)
NON-UH HIE ALK PHOS: 70 UNIT/L (ref 45–117)
NON-UH HIE BASO COUNT: 0.03 X1000 (ref 0–0.2)
NON-UH HIE BASOS %: 0.3 %
NON-UH HIE BILIRUBIN, TOTAL: 1.4 MG/DL (ref 0.3–1.2)
NON-UH HIE BUN/CREAT RATIO: 17
NON-UH HIE BUN: 17 MG/DL (ref 9–23)
NON-UH HIE CALCIUM: 9.7 MG/DL (ref 8.7–10.4)
NON-UH HIE CALCULATED OSMOLALITY: 281 MOSM/KG (ref 275–295)
NON-UH HIE CHLORIDE: 108 MMOL/L (ref 98–107)
NON-UH HIE CO2, VENOUS: 26 MMOL/L (ref 20–31)
NON-UH HIE CREATININE: 1 MG/DL (ref 0.5–0.8)
NON-UH HIE CRP, HIGH SENSITIVITY: 7.4 MG/L
NON-UH HIE DIFF?: NO
NON-UH HIE EOS COUNT: 0.17 X1000 (ref 0–0.5)
NON-UH HIE EOSIN %: 2 %
NON-UH HIE FREE T3: 3 PG/ML (ref 2.3–4.2)
NON-UH HIE FREE T4: 1 NG/DL (ref 0.89–1.76)
NON-UH HIE GAMMA-GT: 20 UNIT/L (ref 0–38)
NON-UH HIE GFR AA: >60
NON-UH HIE GLOBULIN: 3 G/DL
NON-UH HIE GLOMERULAR FILTRATION RATE: 59 ML/MIN/1.73M?
NON-UH HIE GLUCOSE: 91 MG/DL (ref 74–106)
NON-UH HIE GOT: 17 UNIT/L (ref 15–37)
NON-UH HIE GPT: 20 UNIT/L (ref 10–49)
NON-UH HIE HCT: 41.2 % (ref 36–46)
NON-UH HIE HGB: 14 G/DL (ref 12–16)
NON-UH HIE INSTR WBC: 8.6
NON-UH HIE K: 4.6 MMOL/L (ref 3.5–5.1)
NON-UH HIE LYMPH %: 16.2 %
NON-UH HIE LYMPH COUNT: 1.39 X1000 (ref 1.2–4.8)
NON-UH HIE MAGNESIUM: 1.9 MG/DL (ref 1.6–2.6)
NON-UH HIE MCH: 31.2 PG (ref 27–34)
NON-UH HIE MCHC: 34 G/DL (ref 32–37)
NON-UH HIE MCV: 91.8 FL (ref 80–100)
NON-UH HIE MONO %: 7.2 %
NON-UH HIE MONO COUNT: 0.61 X1000 (ref 0.1–1)
NON-UH HIE MPV: 9 FL (ref 7.4–10.4)
NON-UH HIE NA: 140 MMOL/L (ref 135–145)
NON-UH HIE NEUTROPHIL %: 74.4 %
NON-UH HIE NEUTROPHIL COUNT (ANC): 6.38 X1000 (ref 1.4–8.8)
NON-UH HIE NUCLEATED RBC: 0 /100WBC
NON-UH HIE PLATELET: 206 X10 (ref 150–450)
NON-UH HIE RBC: 4.49 X10 (ref 4.2–5.4)
NON-UH HIE RDW: 12.4 % (ref 11.5–14.5)
NON-UH HIE T3, TOTAL: 95 NG/DL (ref 60–181)
NON-UH HIE TOTAL PROTEIN: 7 G/DL (ref 5.7–8.2)
NON-UH HIE TSH: 2.2 UIU/ML (ref 0.55–4.78)
NON-UH HIE WBC: 8.6 X10 (ref 4.5–11)

## 2024-12-26 LAB — NON-UH HIE THYROID (TPO) AB: <0.3 IU/ML (ref 0–9)

## 2024-12-28 LAB — NON-UH HIE THYROGLOBULIN: NORMAL

## 2024-12-29 LAB — Lab: 47.6 (ref 40–92)

## 2025-01-31 LAB
NON-UH HIE ESTRADIOL: 91.4 PG/ML
NON-UH HIE FERRITIN: 59 NG/ML (ref 10–291)
NON-UH HIE FSH: 9.1 IU/L
NON-UH HIE HGB A1C: 5.1 %
NON-UH HIE HOMOCYSTEINE.: 13.6 UMOL/L (ref 3.7–13.9)
NON-UH HIE IRON: 99 UG/DL (ref 50–170)
NON-UH HIE LUTEINIZING HORMONE: 3.7 IU/L
NON-UH HIE PROGESTERONE: 0.66 NG/ML
NON-UH HIE SATURATION: 48.8 % (ref 20–50)
NON-UH HIE TIBC: 203 UG/ML (ref 250–425)

## 2025-02-02 LAB
NON-UH HIE DHEA-S: 284 (ref 35–256)
NON-UH HIE INSULIN, FASTING: 12 (ref 3–25)

## 2025-02-04 LAB
NON-UH HIE DHEA: 4.46 NG/ML (ref 0.63–4.7)
NON-UH HIE SEX HORMONE BINDING GLOBULIN: 32 NMOL/L (ref 25–122)
NON-UH HIE TESTOSTERONE, FREE LC-MS/MS: 3 PG/ML (ref 1.1–5.8)
NON-UH HIE TESTOSTERONE, LC-MS/MS: 18 NG/DL (ref 9–55)

## 2025-02-06 LAB — NON-UH HIE MISC SENDOUT: NORMAL

## 2025-03-06 LAB — NON-UH HIE H PYLORI AG. STOOL: NEGATIVE

## 2025-03-21 LAB
Lab: 1.3 MG/DL (ref 0.2–0.8)
NON-UH HIE A/G RATIO: 1.3
NON-UH HIE ALB: 4.2 G/DL (ref 3.4–5)
NON-UH HIE ALK PHOS: 74 UNIT/L (ref 45–117)
NON-UH HIE BASO COUNT: 0.04 X1000 (ref 0–0.2)
NON-UH HIE BASOS %: 0.9 %
NON-UH HIE BILIRUBIN, DIRECT: 0.54 MG/DL (ref 0–0.4)
NON-UH HIE BILIRUBIN, TOTAL: 1.8 MG/DL (ref 0.3–1.2)
NON-UH HIE BILIRUBIN, TOTAL: 1.9 MG/DL (ref 0.3–1.2)
NON-UH HIE BUN/CREAT RATIO: 16
NON-UH HIE BUN: 16 MG/DL (ref 9–23)
NON-UH HIE CALCIUM: 9.9 MG/DL (ref 8.7–10.4)
NON-UH HIE CALCULATED LDL CHOLESTEROL: 115 MG/DL (ref 60–130)
NON-UH HIE CALCULATED OSMOLALITY: 280 MOSM/KG (ref 275–295)
NON-UH HIE CHLORIDE: 105 MMOL/L (ref 98–107)
NON-UH HIE CHOLESTEROL: 178 MG/DL (ref 100–200)
NON-UH HIE CO2, VENOUS: 24 MMOL/L (ref 20–31)
NON-UH HIE CREATININE: 1 MG/DL (ref 0.5–0.8)
NON-UH HIE CRP, HIGH SENSITIVITY: 3.4 MG/L
NON-UH HIE DIFF?: NORMAL
NON-UH HIE EOS COUNT: 0.13 X1000 (ref 0–0.5)
NON-UH HIE EOSIN %: 2.7 %
NON-UH HIE GFR AA: >60
NON-UH HIE GLOBULIN: 3.4 G/DL
NON-UH HIE GLOMERULAR FILTRATION RATE: 59 ML/MIN/1.73M?
NON-UH HIE GLUCOSE: 83 MG/DL (ref 74–106)
NON-UH HIE GOT: 26 UNIT/L (ref 15–37)
NON-UH HIE GPT: 24 UNIT/L (ref 10–49)
NON-UH HIE HCT: 42.1 % (ref 36–46)
NON-UH HIE HDL CHOLESTEROL: 41 MG/DL (ref 40–60)
NON-UH HIE HGB: 14.4 G/DL (ref 12–16)
NON-UH HIE INSTR WBC: 4.8
NON-UH HIE K: 3.9 MMOL/L (ref 3.5–5.1)
NON-UH HIE LYMPH %: 30.4 %
NON-UH HIE LYMPH COUNT: 1.46 X1000 (ref 1.2–4.8)
NON-UH HIE MCH: 31.2 PG (ref 27–34)
NON-UH HIE MCHC: 34.3 G/DL (ref 32–37)
NON-UH HIE MCV: 90.8 FL (ref 80–100)
NON-UH HIE MONO %: 6.8 %
NON-UH HIE MONO COUNT: 0.33 X1000 (ref 0.1–1)
NON-UH HIE MPV: 9.5 FL (ref 7.4–10.4)
NON-UH HIE NA: 140 MMOL/L (ref 135–145)
NON-UH HIE NEUTROPHIL %: 59.2 %
NON-UH HIE NEUTROPHIL COUNT (ANC): 2.85 X1000 (ref 1.4–8.8)
NON-UH HIE NUCLEATED RBC: 0 /100WBC
NON-UH HIE PLATELET: 217 X10 (ref 150–450)
NON-UH HIE RBC: 4.63 X10 (ref 4.2–5.4)
NON-UH HIE RDW: 12.3 % (ref 11.5–14.5)
NON-UH HIE TOTAL CHOL/HDL CHOL RATIO: 4.3
NON-UH HIE TOTAL PROTEIN: 7.6 G/DL (ref 5.7–8.2)
NON-UH HIE TRIGLYCERIDES: 109 MG/DL (ref 30–150)
NON-UH HIE WBC: 4.8 X10 (ref 4.5–11)

## 2025-03-23 LAB
NON-UH HIE C-PEPTIDE: 1.7 NG/ML (ref 0.5–3.3)
NON-UH HIE INSULIN, FASTING: 6 (ref 3–25)
NON-UH HIE SMITH (ENA) ANTIBODY, IGG: 3 (ref 0–40)
NON-UH HIE SMITH/RNP (ENA) AB, IGG: 5 (ref 0–19)
NON-UH HIE SSA 52 (RO) (ENA) AB, IGG: 33 (ref 0–40)
NON-UH HIE SSA 60 (RO) (ENA) AB, IGG: 1 (ref 0–40)
NON-UH HIE SSB (LA) (ENA) AB, IGG: 0 (ref 0–40)

## 2025-03-24 LAB — NON-UH HIE THYROGLOBULIN: NORMAL

## 2025-04-29 ENCOUNTER — APPOINTMENT (OUTPATIENT)
Dept: PRIMARY CARE | Facility: CLINIC | Age: 45
End: 2025-04-29
Payer: COMMERCIAL

## 2025-05-10 LAB — NON-UH HIE DHEA-S: 469 (ref 35–256)

## 2025-07-22 LAB
NON-UH HIE A/G RATIO: 1.2
NON-UH HIE ALB: 4 G/DL (ref 3.4–5)
NON-UH HIE ALK PHOS: 71 UNIT/L (ref 45–117)
NON-UH HIE BASO COUNT: 0.03 X1000 (ref 0–0.2)
NON-UH HIE BASOS %: 0.5 %
NON-UH HIE BILIRUBIN, TOTAL: 1.3 MG/DL (ref 0.3–1.2)
NON-UH HIE BUN/CREAT RATIO: 15.6
NON-UH HIE BUN: 14 MG/DL (ref 9–23)
NON-UH HIE CALCIUM: 9.4 MG/DL (ref 8.7–10.4)
NON-UH HIE CALCULATED OSMOLALITY: 286 MOSM/KG (ref 275–295)
NON-UH HIE CHLORIDE: 106 MMOL/L (ref 98–107)
NON-UH HIE CO2, VENOUS: 27 MMOL/L (ref 20–31)
NON-UH HIE CREATININE: 0.9 MG/DL (ref 0.5–0.8)
NON-UH HIE CRP, HIGH SENSITIVITY: 2.3 MG/L
NON-UH HIE DIFF?: NORMAL
NON-UH HIE EOS COUNT: 0.09 X1000 (ref 0–0.5)
NON-UH HIE EOSIN %: 1.5 %
NON-UH HIE FOLATE: 45.8 NG/ML (ref 5.4–17.5)
NON-UH HIE FREE T3: 2.9 PG/ML (ref 2.3–4.2)
NON-UH HIE FREE T4: 1.12 NG/DL (ref 0.89–1.76)
NON-UH HIE GFR AA: >60
NON-UH HIE GLOBULIN: 3.3 G/DL
NON-UH HIE GLOMERULAR FILTRATION RATE: >60 ML/MIN/1.73M?
NON-UH HIE GLUCOSE: 98 MG/DL (ref 74–106)
NON-UH HIE GOT: 21 UNIT/L (ref 15–37)
NON-UH HIE GPT: 20 UNIT/L (ref 10–49)
NON-UH HIE HCT: 42.3 % (ref 36–46)
NON-UH HIE HGB: 14.1 G/DL (ref 12–16)
NON-UH HIE INSTR WBC: 6.3
NON-UH HIE K: 4 MMOL/L (ref 3.5–5.1)
NON-UH HIE LYMPH %: 23 %
NON-UH HIE LYMPH COUNT: 1.44 X1000 (ref 1.2–4.8)
NON-UH HIE MCH: 31.2 PG (ref 27–34)
NON-UH HIE MCHC: 33.4 G/DL (ref 32–37)
NON-UH HIE MCV: 93.5 FL (ref 80–100)
NON-UH HIE MONO %: 6.8 %
NON-UH HIE MONO COUNT: 0.43 X1000 (ref 0.1–1)
NON-UH HIE MPV: 9.7 FL (ref 7.4–10.4)
NON-UH HIE NA: 143 MMOL/L (ref 135–145)
NON-UH HIE NEUTROPHIL %: 68.2 %
NON-UH HIE NEUTROPHIL COUNT (ANC): 4.29 X1000 (ref 1.4–8.8)
NON-UH HIE NUCLEATED RBC: 0 /100WBC
NON-UH HIE PLATELET: 214 X10 (ref 150–450)
NON-UH HIE RBC: 4.52 X10 (ref 4.2–5.4)
NON-UH HIE RDW: 12.8 % (ref 11.5–14.5)
NON-UH HIE T3, TOTAL: 76 NG/DL (ref 60–181)
NON-UH HIE TOTAL PROTEIN: 7.3 G/DL (ref 5.7–8.2)
NON-UH HIE TSH: 2.88 UIU/ML (ref 0.55–4.78)
NON-UH HIE VIT D 25: 67 NG/ML
NON-UH HIE VITAMIN B12: 1209 PG/ML (ref 211–911)
NON-UH HIE WBC: 6.3 X10 (ref 4.5–11)

## 2025-07-24 LAB
Lab: 35.2 %
Lab: 50.6 (ref 40–92)
Lab: 833 NG/ML
NON-UH HIE C-PEPTIDE: 2.1 NG/ML (ref 0.5–3.3)
NON-UH HIE INSULIN, FASTING: 10 (ref 3–25)
NON-UH HIE THYROID (TPO) AB: 0.3 IU/ML (ref 0–9)

## 2025-07-25 LAB
NON-UH HIE SMITH (ENA) ANTIBODY, IGG: 1 (ref 0–40)
NON-UH HIE SMITH/RNP (ENA) AB, IGG: 2 (ref 0–19)
NON-UH HIE SSA 52 (RO) (ENA) AB, IGG: 30 (ref 0–40)
NON-UH HIE SSA 60 (RO) (ENA) AB, IGG: 1 (ref 0–40)
NON-UH HIE SSB (LA) (ENA) AB, IGG: 0 (ref 0–40)

## 2025-07-27 LAB — NON-UH HIE TRIIODOTHYRONINE, REVERSE - LC-MS/MS: 12.1 NG/DL (ref 9–27)

## 2025-07-28 LAB — NON-UH HIE THYROGLOBULIN: NORMAL
